# Patient Record
Sex: MALE | Race: AMERICAN INDIAN OR ALASKA NATIVE
[De-identification: names, ages, dates, MRNs, and addresses within clinical notes are randomized per-mention and may not be internally consistent; named-entity substitution may affect disease eponyms.]

---

## 2020-02-08 ENCOUNTER — HOSPITAL ENCOUNTER (EMERGENCY)
Dept: HOSPITAL 5 - ED | Age: 35
LOS: 1 days | Discharge: LEFT BEFORE DISCHARGE | End: 2020-02-09
Payer: SELF-PAY

## 2020-02-08 DIAGNOSIS — Z53.21: ICD-10-CM

## 2020-02-08 DIAGNOSIS — R10.9: Primary | ICD-10-CM

## 2020-02-10 ENCOUNTER — HOSPITAL ENCOUNTER (EMERGENCY)
Dept: HOSPITAL 5 - ED | Age: 35
Discharge: HOME | End: 2020-02-10
Payer: COMMERCIAL

## 2020-02-10 VITALS — DIASTOLIC BLOOD PRESSURE: 77 MMHG | SYSTOLIC BLOOD PRESSURE: 124 MMHG

## 2020-02-10 DIAGNOSIS — F17.200: ICD-10-CM

## 2020-02-10 DIAGNOSIS — N45.1: Primary | ICD-10-CM

## 2020-02-10 DIAGNOSIS — Z79.899: ICD-10-CM

## 2020-02-10 LAB
BACTERIA #/AREA URNS HPF: (no result) /HPF
BILIRUB UR QL STRIP: (no result)
BLOOD UR QL VISUAL: (no result)
MUCOUS THREADS #/AREA URNS HPF: (no result) /HPF
PH UR STRIP: 5 [PH] (ref 5–7)
PROT UR STRIP-MCNC: (no result) MG/DL
RBC #/AREA URNS HPF: 4 /HPF (ref 0–6)
UROBILINOGEN UR-MCNC: < 2 MG/DL (ref ?–2)
WBC #/AREA URNS HPF: 113 /HPF (ref 0–6)

## 2020-02-10 PROCEDURE — 96372 THER/PROPH/DIAG INJ SC/IM: CPT

## 2020-02-10 PROCEDURE — 81001 URINALYSIS AUTO W/SCOPE: CPT

## 2020-02-10 PROCEDURE — 93975 VASCULAR STUDY: CPT

## 2020-02-10 PROCEDURE — 99284 EMERGENCY DEPT VISIT MOD MDM: CPT

## 2020-02-10 NOTE — EMERGENCY DEPARTMENT REPORT
ED Male  HPI





- General


Chief complaint: Urogenital-Male


Stated complaint: UTI


Time Seen by Provider: 02/10/20 10:25


Source: patient


Mode of arrival: Ambulatory


Limitations: No Limitations





- History of Present Illness


Initial comments: 





Patient is a 34-year-old F American male with no significant past medical 

history was presenting with right sided testicular pain.  Patient states that 

his girlfriend was treated for a urinary tract infection approximately 2 weeks 

ago.  Patient is denies any dysuria or urinary frequency or penile discharge.  

Patient states that he is developed over the past 2days swelling and tenderness 

to the right testicle.  Patient denies fevers chills cough cold congestion 

nausea or vomiting.





- Related Data


                                  Previous Rx's











 Medication  Instructions  Recorded  Last Taken  Type


 


Ciprofloxacin HCl [Ciprofloxacin 500 mg PO Q12HR #14 tab 02/10/20 Unknown Rx





TAB]    


 


Ketorolac [Toradol] 10 mg PO Q6H PRN #12 tablet 02/10/20 Unknown Rx


 


traMADoL [Ultram] 50 mg PO Q6HR PRN #12 tablet 02/10/20 Unknown Rx











                                    Allergies











Allergy/AdvReac Type Severity Reaction Status Date / Time


 


No Known Allergies Allergy   Verified 02/08/20 13:24














ED Review of Systems


ROS: 


Stated complaint: UTI


Other details as noted in HPI





Comment: All other systems reviewed and negative





ED Past Medical Hx





- Past Medical History


Previous Medical History?: No





- Surgical History


Past Surgical History?: No





- Social History


Smoking Status: Current Every Day Smoker


Substance Use Type: Alcohol





- Medications


Home Medications: 


                                Home Medications











 Medication  Instructions  Recorded  Confirmed  Last Taken  Type


 


Ciprofloxacin HCl [Ciprofloxacin 500 mg PO Q12HR #14 tab 02/10/20  Unknown Rx





TAB]     


 


Ketorolac [Toradol] 10 mg PO Q6H PRN #12 tablet 02/10/20  Unknown Rx


 


traMADoL [Ultram] 50 mg PO Q6HR PRN #12 tablet 02/10/20  Unknown Rx














ED Physical Exam





- General


Limitations: No Limitations


General appearance: alert, in no apparent distress





- Head


Head exam: Present: atraumatic, normocephalic





- Eye


Eye exam: Present: normal appearance, PERRL, EOMI





- ENT


ENT exam: Present: mucous membranes moist





- Neck


Neck exam: Present: normal inspection





- Respiratory


Respiratory exam: Present: normal lung sounds bilaterally.  Absent: respiratory 

distress, wheezes, rales, rhonchi





- Cardiovascular


Cardiovascular Exam: Present: regular rate, normal rhythm.  Absent: systolic 

murmur, diastolic murmur, rubs, gallop





- GI/Abdominal


GI/Abdominal exam: Present: soft, normal bowel sounds.  Absent: tenderness, 

guarding, rebound





- Rectal


Rectal exam: Present: deferred





- 


 exam: Present: testicular tenderness, scrotal swelling (right), circumcision.

  Absent: urethral discharge





- Extremities Exam


Extremities exam: Present: normal inspection





- Back Exam


Back exam: Present: normal inspection





- Neurological Exam


Neurological exam: Present: alert, oriented X3





- Psychiatric


Psychiatric exam: Present: normal affect, normal mood





- Skin


Skin exam: Present: warm, dry, intact, normal color.  Absent: rash





ED Course





                                   Vital Signs











  02/10/20





  09:35


 


Temperature 97.3 F L


 


Pulse Rate 81


 


Respiratory 16





Rate 


 


Blood Pressure 123/78


 


O2 Sat by Pulse 99





Oximetry 














ED Medical Decision Making





- Lab Data








                                   Lab Results











  02/10/20 Range/Units





  Unknown 


 


Urine Color  Yellow  (Yellow)  


 


Urine Turbidity  Slightly-cloudy  (Clear)  


 


Urine pH  5.0  (5.0-7.0)  


 


Ur Specific Gravity  1.029  (1.003-1.030)  


 


Urine Protein  <15 mg/dl  (Negative)  mg/dL


 


Urine Glucose (UA)  Neg  (Negative)  mg/dL


 


Urine Ketones  Neg  (Negative)  mg/dL


 


Urine Blood  Neg  (Negative)  


 


Urine Nitrite  Neg  (Negative)  


 


Urine Bilirubin  Neg  (Negative)  


 


Urine Urobilinogen  < 2.0  (<2.0)  mg/dL


 


Ur Leukocyte Esterase  Mod  (Negative)  


 


Urine WBC (Auto)  113.0 H  (0.0-6.0)  /HPF


 


Urine RBC (Auto)  4.0  (0.0-6.0)  /HPF


 


U Epithel Cells (Auto)  2.0  (0-13.0)  /HPF


 


Urine Bacteria (Auto)  1+  (Negative)  /HPF


 


Urine Mucus  Few  /HPF














- Radiology Data





Age/Sex: 34 / M ADM Date: 02/10/20 





 Loc: ED 


 Attending Dr: 








 Ordering Physician: CHAYA RUBIO MD 


 Date of Service: 02/10/20 


 Procedure(s): US testicular doppler comp 


 Accession Number(s): [id number] 





 cc: CHAYA RUBIO MD 








 SCROTAL ULTRASOUND WITH DOPPLER 





HISTORY: right testicular pain 





COMPARISON: None. 





TECHNIQUE: Grayscale, color and spectral Doppler images were obtained of the 

scrotum. 





FINDINGS: 


RIGHT: 


Right testicle: No significant abnormality. No mass. 


Right testicular size: 3.7 x 2.2 x 3.8 cm. 


Right epididymis: Mildly enlarged with increased vascularity. Tiny benign right 

epididymal head 


 cyst measures 1.4 mm. 





LEFT: 


Left testicle: No significant abnormality. No mass. 


Left testicular size: 4.9 x 2.3 x 3.0 cm. 


Left epididymis: No significant abnormality. 





Additional findings: A small right hydrocele. 





IMPRESSION: 


1. Right mild acute epididymitis. 


2. No abscess. 


3. Normal testes. 





Signer Name: Hussain Gonzalez MD 


Signed: 2/10/2020 11:36 AM 


Workstation Name: [id number]





- Medical Decision Making





Patient's ultrasound was consistent with a right-sided epididymitis.  Patient 

likely has a sexually transmitted disease.  Cultures been sent for gonorrhea and

 chlamydia.  Patient be treated with Cipro at home but also has been given 

Rocephin and azithromycin here in the emergency department.


Critical care attestation.: 


If time is entered above; I have spent that time in minutes in the direct care 

of this critically ill patient, excluding procedure time.








ED Disposition


Clinical Impression: 


 Acute epididymitis





Disposition: DC-01 TO HOME OR SELFCARE


Is pt being admited?: No


Does the pt Need Aspirin: No


Condition: Stable


Instructions:  Epididymitis (ED)


Referrals: 


[facility], [place] [Primary Care Provider] - 3-5 Days


Forms:  STI Treatment and Prevention


Time of Disposition: 12:02

## 2020-02-10 NOTE — ULTRASOUND REPORT
SCROTAL ULTRASOUND WITH DOPPLER



HISTORY: right testicular pain



COMPARISON: None.



TECHNIQUE: Grayscale, color and spectral  Doppler images were obtained of the scrotum. 



FINDINGS:

RIGHT:

Right testicle: No significant abnormality. No mass.

Right testicular size: 3.7 x 2.2 x 3.8 cm.  

Right epididymis: Mildly enlarged with increased vascularity. Tiny benign right epididymal head cyst 
measures 1.4 mm.



LEFT:

Left testicle:  No significant abnormality. No mass.

Left testicular size: 4.9 x 2.3 x 3.0 cm.  

Left epididymis: No significant abnormality.



Additional findings: A small right hydrocele.



IMPRESSION:

1. Right mild acute epididymitis.

2. No abscess.

3. Normal testes.



Signer Name: Hussain Gonzalez MD 

Signed: 2/10/2020 11:36 AM

 Workstation Name: [id number]